# Patient Record
Sex: FEMALE | Race: WHITE | NOT HISPANIC OR LATINO
[De-identification: names, ages, dates, MRNs, and addresses within clinical notes are randomized per-mention and may not be internally consistent; named-entity substitution may affect disease eponyms.]

---

## 2019-11-19 ENCOUNTER — APPOINTMENT (OUTPATIENT)
Dept: OTOLARYNGOLOGY | Facility: CLINIC | Age: 24
End: 2019-11-19

## 2022-02-14 ENCOUNTER — APPOINTMENT (OUTPATIENT)
Dept: DERMATOLOGY | Facility: CLINIC | Age: 27
End: 2022-02-14
Payer: COMMERCIAL

## 2022-02-14 ENCOUNTER — NON-APPOINTMENT (OUTPATIENT)
Age: 27
End: 2022-02-14

## 2022-02-14 DIAGNOSIS — R21 RASH AND OTHER NONSPECIFIC SKIN ERUPTION: ICD-10-CM

## 2022-02-14 DIAGNOSIS — L90.5 SCAR CONDITIONS AND FIBROSIS OF SKIN: ICD-10-CM

## 2022-02-14 PROCEDURE — 99203 OFFICE O/P NEW LOW 30 MIN: CPT

## 2022-02-14 RX ORDER — ACETAMINOPHEN 325 MG
TABLET ORAL
Refills: 0 | Status: ACTIVE | COMMUNITY

## 2022-02-14 RX ORDER — FLUTICASONE PROPIONATE 0.5 MG/G
0.05 CREAM TOPICAL TWICE DAILY
Qty: 1 | Refills: 1 | Status: ACTIVE | COMMUNITY
Start: 2022-02-14 | End: 1900-01-01

## 2022-02-14 NOTE — ASSESSMENT
[FreeTextEntry1] : Rash of the upper abdomen.\par Declined punch tx today.\par r/O arthropod reaction, vs. pseudolymphoma, other.\par Cutivate cream bid.\par If recurrent, persists or progresses, f/u and recommend bx.\par \par Cicatrix - abdomen.\par Message regularly.

## 2022-02-14 NOTE — HISTORY OF PRESENT ILLNESS
[FreeTextEntry1] : Rash of the upper abdomen [de-identified] : Present for a month or more, with increasing size.  Denies itching.  No self tx.  No recent travel.\par Also with scar of the abdomen, next to the umbilus, s/p excision over this past summer.

## 2022-02-14 NOTE — PHYSICAL EXAM
[FreeTextEntry3] : Mildly indurated erythematous patch, upper abdomen, just right of the midline.  3 cm, oval shape.\par \par Oval shapped mildly indurated patch of the abdomen, just superior to and right of the umbilicus.

## 2022-06-08 ENCOUNTER — APPOINTMENT (OUTPATIENT)
Dept: DERMATOLOGY | Facility: CLINIC | Age: 27
End: 2022-06-08